# Patient Record
Sex: FEMALE
[De-identification: names, ages, dates, MRNs, and addresses within clinical notes are randomized per-mention and may not be internally consistent; named-entity substitution may affect disease eponyms.]

---

## 2019-08-20 LAB
ANION GAP SERPL CALC-SCNC: 12.1 MMOL/L (ref 8–16)
BASOPHILS # BLD AUTO: 0 10*3/UL (ref 0–0.1)
BASOPHILS NFR BLD AUTO: 0.3 % (ref 0–1)
BUN SERPL-MCNC: 11 MG/DL (ref 7–26)
BUN/CREAT SERPL: 14 (ref 6–25)
CALCIUM SERPL-MCNC: 9.7 MG/DL (ref 8.4–10.2)
CHLORIDE SERPL-SCNC: 104 MMOL/L (ref 98–107)
CO2 SERPL-SCNC: 28 MMOL/L (ref 22–29)
DEPRECATED NEUTROPHILS # BLD AUTO: 3.6 10*3/UL (ref 2.1–6.9)
EGFRCR SERPLBLD CKD-EPI 2021: > 60 ML/MIN (ref 60–?)
EOSINOPHIL # BLD AUTO: 0.1 10*3/UL (ref 0–0.4)
EOSINOPHIL NFR BLD AUTO: 1.2 % (ref 0–6)
ERYTHROCYTE [DISTWIDTH] IN CORD BLOOD: 13.4 % (ref 11.7–14.4)
GLUCOSE SERPLBLD-MCNC: 92 MG/DL (ref 74–118)
HCT VFR BLD AUTO: 44.3 % (ref 34.2–44.1)
HGB BLD-MCNC: 14.4 G/DL (ref 12–16)
LYMPHOCYTES # BLD: 1.7 10*3/UL (ref 1–3.2)
LYMPHOCYTES NFR BLD AUTO: 29.8 % (ref 18–39.1)
MCH RBC QN AUTO: 30.9 PG (ref 28–32)
MCHC RBC AUTO-ENTMCNC: 32.5 G/DL (ref 31–35)
MCV RBC AUTO: 95.1 FL (ref 81–99)
MONOCYTES # BLD AUTO: 0.4 10*3/UL (ref 0.2–0.8)
MONOCYTES NFR BLD AUTO: 7.2 % (ref 4.4–11.3)
NEUTS SEG NFR BLD AUTO: 61.3 % (ref 38.7–80)
PLATELET # BLD AUTO: 264 X10E3/UL (ref 140–360)
POTASSIUM SERPL-SCNC: 4.1 MMOL/L (ref 3.5–5.1)
RBC # BLD AUTO: 4.66 X10E6/UL (ref 3.6–5.1)
SODIUM SERPL-SCNC: 140 MMOL/L (ref 136–145)

## 2019-08-20 NOTE — DIAGNOSTIC IMAGING REPORT
Chest, 2 views,  8/20/2019.   

 



History: Preop, right foot surgery.



Comparison: None available.



Findings: The cardiomediastinal silhouette and pulmonary vasculature are within

normal limits. There is minimal biapical pleural thickening. The lungs are

clear without evidence of consolidation or pleural effusion. Mild degenerative

changes are noted in the thoracic spine. There are no acute osseous or soft

tissue abnormalities. 



Impression: 

No acute cardiopulmonary abnormality. 



Signed by: Dl Ramirez on 8/20/2019 4:40 PM

## 2019-08-23 ENCOUNTER — HOSPITAL ENCOUNTER (OUTPATIENT)
Dept: HOSPITAL 88 - OR | Age: 59
Discharge: HOME | End: 2019-08-23
Attending: PODIATRIST
Payer: COMMERCIAL

## 2019-08-23 VITALS — SYSTOLIC BLOOD PRESSURE: 118 MMHG | DIASTOLIC BLOOD PRESSURE: 90 MMHG

## 2019-08-23 DIAGNOSIS — Z01.811: ICD-10-CM

## 2019-08-23 DIAGNOSIS — Z01.810: ICD-10-CM

## 2019-08-23 DIAGNOSIS — Z88.0: ICD-10-CM

## 2019-08-23 DIAGNOSIS — G57.61: Primary | ICD-10-CM

## 2019-08-23 DIAGNOSIS — Z01.812: ICD-10-CM

## 2019-08-23 DIAGNOSIS — M79.671: ICD-10-CM

## 2019-08-23 PROCEDURE — 71046 X-RAY EXAM CHEST 2 VIEWS: CPT

## 2019-08-23 PROCEDURE — 80048 BASIC METABOLIC PNL TOTAL CA: CPT

## 2019-08-23 PROCEDURE — 36415 COLL VENOUS BLD VENIPUNCTURE: CPT

## 2019-08-23 PROCEDURE — 88304 TISSUE EXAM BY PATHOLOGIST: CPT

## 2019-08-23 PROCEDURE — 93005 ELECTROCARDIOGRAM TRACING: CPT

## 2019-08-23 PROCEDURE — 85025 COMPLETE CBC W/AUTO DIFF WBC: CPT

## 2019-08-23 NOTE — XMS REPORT
Patient Summary Document

                             Created on: 2019



MONAE PEREZ

External Reference #: [id number]

: 1960

Sex: Female



Demographics







                          Address                   [id number] [place] [address]  [address]

 

                          Home Phone                [phone]

 

                          Preferred Language        Unknown

 

                          Marital Status            Unknown

 

                          Holiness Affiliation     Unknown

 

                          Race                      Unknown

 

                                        Additional Race(s)  

 

                          Ethnic Group              Unknown





Author







                          [facility]                    [facility]

 

                          [facility]

 

                          Address                   Unknown

 

                          Phone                     Unavailable







Care Team Providers







                    Care Team Member Name    Role                Phone

 

                    MICK GAMING    Unavailable         Unavailable







Problems

This patient has no known problems.



Allergies, Adverse Reactions, Alerts

This patient has no known allergies or adverse reactions.



Medications

This patient has no known medications.



Results







           Test Description    Test Time    Test Comments    Text Results    Atomic Results    Result

 Comments

 

                CHEST 2 VIEWS    2019 16:39:00                                                             

                                             [place] [address]  
   Patient Name: MONAE PEREZ                                   MR #: [id number]
                    : 1960                                   Age/Sex: 
59/F  Acct #: [id number]                              Req #: [id number]  Adm 
Physician:                                                      Ordered by: 
MICK GAMING DPM                            Report #: [id number]        
Location: OR                                      Room/Bed:                     
___________________________________________________________________________________________________
   Procedure: [id number] DX/CHEST 2 VIEWS  Exam Date: 19                   
        Exam Time: 1615                                              REPORT 
STATUS: Signed    Chest, 2 views,  2019.             History: Preop, right 
foot surgery.      Comparison: None available.      Findings: The 
cardiomediastinal silhouette and pulmonary vasculature are within   normal 
limits. There is minimal biapical pleural thickening. The lungs are   clear 
without evidence of consolidation or pleural effusion. Mild degenerative   
changes are noted in the thoracic spine. There are no acute osseous or soft   
tissue abnormalities.       Impression:    No acute cardiopulmonary abnormality.
      Signed by: Rachael Ramirez on 2019 4:40 PM        Dictated By: RACHAEL RAMIREZ MD  Electronically Signed By: RACHAEL RAMIREZ MD on 19 1640  Trans
cribed By: PETE on 19 1640       COPY TO:   MICK GAMING DPROXANNE         
                                         

 

                BREAST ULTRASOUND LEFT    2019-05-15 09:05:12                    - BREAST ULTRASOUND LEFTULTRASOUND

 OF LEFT BREAST AND LEFT AXILLA: 5/15/2019CLINICAL: LT Lump axilla x 1 month.  
Comparison is made to exam dated  5/15/2019 mammogram - [facility].  Color flow, real-time, and Doppler ultrasound of the left breast and axilla
were performed.  Gray scale images of the real-time examination were reviewed.  
No abnormalities were seen sonographically in the left breast or the left 
axilla.  IMPRESSION: BENIGN There is no sonographic evidence of malignancy.  
Resume annual screening mammography in one year.  Enzo Perez M.D.  
et/:5/15/2019 09:05:12      Entry: [email or web address]  2019 12:27:58Imaging Technologist: 
Betzy Oswald , [facility]-[place]letter sent: BIRADS 1-2 Combo FU 
Letter  Ultrasound BI-RADS: 2 Benign     

 

                DIAG MAMM BILATERAL JUANPABLO CAD DIGITAL    2019-05-15 09:04:40                     - DIAG MAMM BILATERAL

 JUANPABLO CAD DIGITALBILATERAL DIGITAL DIAGNOSTIC MAMMOGRAM 3D/2D WITH CAD: 
5/15/2019Digital breast tomosynthesis was performed in addition to routine CC 
and MLO views.  Current mammographic images were evaluated by either a [employer] M-
Vu or a [employer] ImageChecker CAD (computer aided detection system).  Comparison 
is made to exam dated  2015 mammogram - [facility].  There 
are scattered fibroglandular tissues in both breasts.  There are benign 
calcifications in both breasts.  No suspicious mass, architectural distortion, 
malignant type calcification, or lymph node abnormality detected.  IMPRESSION: 
INCOMPLETE ASSESSMENT: ADDITIONAL IMAGING EVALUATION RECOMMENDEDThere is no 
mammographic abnormality seen in the left breast to correspond with the reported
palpable abnormality; however, further workup with ultrasound is recommended.  
Enzo Perez M.D.          et/:5/15/2019 09:04:40      Entry: [email or web address]  2019 
12:26:23Imaging Technologist: [facility] [place] [facility], [facility]-
FWMammogram BI-RADS: 0 Indeterminate

## 2019-08-23 NOTE — OPERATIVE REPORT
DATE OF PROCEDURE:  08/23/2019

 

SURGEON:  Lencho Archer DPM

 

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

 

TITLE OF THE OPERATION:  Excision of neuroma 3rd intermetatarsal space, right foot.

 

ANESTHESIA:  General endotracheal.

 

HEMOSTASIS:  A right thigh tourniquet at 350 mmHg.

 

PROCEDURE IN DETAIL:  The patient was taken to the operating room in a mildly sedated

state, placed on the operating table in supine position.  Following induction of general

anesthetic, the right lower extremity was elevated to 60 degrees to exsanguinate before

inflating the pneumatic thigh tourniquet to 350 mmHg to create good hemostasis.  Right

lower extremity was placed on the operating table prior to performing the following

procedure: 

 

PROCEDURE #1:  Neuroma, 3rd intermetatarsal space of the right foot and approximate 3 cm

dorsal incision was made overlying the dorsal aspect of the 3rd intermetatarsal space of

right foot.  The incision was deepened via sharp and blunt dissection below the dorsal

capsular structure.  Care was taken to identify, retract all vital structures

encountered.  The 3rd intermetatarsal space was identified and transverse

intermetatarsal ligament was sectioned allowing deliverance of the large neuroma into

the interspace.  This was dissected distally and proximally resected from the wound in

toto.  The area was irrigated with copious amounts of sterile saline solution.  Deep

closure with 3-0 Vicryl, subcutaneous closure with 4-0 Vicryl, skin closure with 4-0

nylon.  An injection with human tissue allograft flowable was performed and placed near

the stump end of the neuroma to prevent recurrence.  The patient left the operating room

with vital signs stable in apparent satisfactory condition having tolerated both the

anesthetic and procedure very well. 

 

 

 

 

______________________________

WILLIS Baron/MODL

D:  08/23/2019 20:53:33

T:  08/23/2019 23:34:07

Job #:  [id number]